# Patient Record
Sex: FEMALE | Race: WHITE | NOT HISPANIC OR LATINO | Employment: FULL TIME | URBAN - METROPOLITAN AREA
[De-identification: names, ages, dates, MRNs, and addresses within clinical notes are randomized per-mention and may not be internally consistent; named-entity substitution may affect disease eponyms.]

---

## 2019-04-03 ENCOUNTER — HOSPITAL ENCOUNTER (EMERGENCY)
Facility: HOSPITAL | Age: 24
Discharge: HOME | End: 2019-04-03
Payer: COMMERCIAL

## 2019-04-03 VITALS
RESPIRATION RATE: 16 BRPM | HEART RATE: 83 BPM | OXYGEN SATURATION: 97 % | TEMPERATURE: 99 F | BODY MASS INDEX: 34.15 KG/M2 | HEIGHT: 64 IN | DIASTOLIC BLOOD PRESSURE: 73 MMHG | SYSTOLIC BLOOD PRESSURE: 118 MMHG | WEIGHT: 200 LBS

## 2019-04-03 DIAGNOSIS — L02.01 ABSCESS OF CHIN: Primary | ICD-10-CM

## 2019-04-03 PROCEDURE — 99284 EMERGENCY DEPT VISIT MOD MDM: CPT

## 2019-04-03 PROCEDURE — 87070 CULTURE OTHR SPECIMN AEROBIC: CPT

## 2019-04-03 PROCEDURE — 96372 THER/PROPH/DIAG INJ SC/IM: CPT

## 2019-04-03 PROCEDURE — 10060 I&D ABSCESS SIMPLE/SINGLE: CPT

## 2019-04-03 PROCEDURE — 25000003 PHARM REV CODE 250: Performed by: NURSE PRACTITIONER

## 2019-04-03 PROCEDURE — 87077 CULTURE AEROBIC IDENTIFY: CPT

## 2019-04-03 PROCEDURE — 63600175 PHARM REV CODE 636 W HCPCS: Performed by: NURSE PRACTITIONER

## 2019-04-03 PROCEDURE — 87186 SC STD MICRODIL/AGAR DIL: CPT

## 2019-04-03 RX ORDER — TRAMADOL HYDROCHLORIDE 50 MG/1
50 TABLET ORAL EVERY 6 HOURS PRN
Qty: 12 TABLET | Refills: 0 | Status: ON HOLD | OUTPATIENT
Start: 2019-04-03 | End: 2021-08-25 | Stop reason: HOSPADM

## 2019-04-03 RX ORDER — LIDOCAINE HYDROCHLORIDE 10 MG/ML
5 INJECTION, SOLUTION EPIDURAL; INFILTRATION; INTRACAUDAL; PERINEURAL
Status: DISCONTINUED | OUTPATIENT
Start: 2019-04-03 | End: 2019-04-03

## 2019-04-03 RX ORDER — MUPIROCIN 20 MG/G
OINTMENT TOPICAL 3 TIMES DAILY
Status: ON HOLD | COMMUNITY
End: 2021-08-25 | Stop reason: HOSPADM

## 2019-04-03 RX ORDER — LIDOCAINE HYDROCHLORIDE 10 MG/ML
5 INJECTION INFILTRATION; PERINEURAL ONCE
Status: COMPLETED | OUTPATIENT
Start: 2019-04-03 | End: 2019-04-03

## 2019-04-03 RX ORDER — CEFTRIAXONE 1 G/1
1 INJECTION, POWDER, FOR SOLUTION INTRAMUSCULAR; INTRAVENOUS
Status: COMPLETED | OUTPATIENT
Start: 2019-04-03 | End: 2019-04-03

## 2019-04-03 RX ORDER — SULFAMETHOXAZOLE AND TRIMETHOPRIM 800; 160 MG/1; MG/1
1 TABLET ORAL
Status: ON HOLD | COMMUNITY
End: 2021-08-25 | Stop reason: HOSPADM

## 2019-04-03 RX ADMIN — CEFTRIAXONE SODIUM 1 G: 1 INJECTION, POWDER, FOR SOLUTION INTRAMUSCULAR; INTRAVENOUS at 08:04

## 2019-04-03 RX ADMIN — LIDOCAINE HYDROCHLORIDE 5 ML: 10 INJECTION, SOLUTION INFILTRATION; PERINEURAL at 08:04

## 2019-04-04 NOTE — DISCHARGE INSTRUCTIONS
Cont with Bactrim and bacitracin  Ultram for pain if Motrin not effective  Warm compresses  Wash hands

## 2019-04-04 NOTE — ED TRIAGE NOTES
"" I went to the emergency room yesterday" reports I&D was attempting yesterday at [facility], pt reported doctor states " very little" drainage come out of wound during I&D currently present to the ER with worsening swelling to L sided chin/jaw, reports abscess x 3 days, denies fever/cjhills, rates pain 9/10, place on antibiotics Bactrim DS  "

## 2019-04-06 LAB — BACTERIA SPEC AEROBE CULT: NORMAL

## 2019-04-06 NOTE — ED PROVIDER NOTES
Encounter Date: 4/3/2019       History     Chief Complaint   Patient presents with    Abscess     on chin, seen in ED yesterday at [facility], needle I&D and prescribed antibx.  Patient says it is worse today     Nyla Cabrera is a 23 y,o female with no sign PMHx. She presents to ED with abscess to chin x 3 days  She was seen at [facility] yesterday. Needle I&D performed and Bactrim prescribed  Patient concerned with increased amount of swelling. No drainage  No fever, chills or body aches          Review of patient's allergies indicates:   Allergen Reactions    No known drug allergies      Past Medical History:   Diagnosis Date    Acute bronchitis with bronchospasm     Influenza B 12/15/10    Respiratory Flu, Type B    Mild allergic rhinitis     Vision abnormalities     Myopia w/ Contacts/Corrective Lenses     Past Surgical History:   Procedure Laterality Date    ADENOIDECTOMY      Age 7  T&A    TONSILLECTOMY      T&A   Age 7     Family History   Problem Relation Age of Onset    Hypertension Father     Obesity Father     Sleep apnea Father     Anemia Sister     Thyroid disease Sister     Allergic rhinitis Sister     Insulin resistance Sister     Obesity Sister         Overweight/Obesity    Other Sister         1) RAD. 2) s/p T&A    Eczema Sister     Vision loss Sister         Myopia    Allergies Sister         Food Allergies    Asthma Maternal Uncle         Childhood    Hypertension Paternal Uncle     Obesity Paternal Uncle     Sleep apnea Paternal Uncle     Breast cancer Maternal Grandmother         Survivor    Mitral valve prolapse Maternal Grandmother     Arthritis Maternal Grandmother     Asthma Paternal Grandmother     Thyroid disease Paternal Grandmother     Depression Paternal Grandmother     Fibromyalgia Paternal Grandmother     Diverticulitis Paternal Grandmother     Asthma Sister     Allergic rhinitis Sister     Other Sister         s/p T&A    Eczema Sister     Breast cancer Other      Arthritis Other     Heart disease Other     Rheum arthritis Other     Cancer Other     Asthma Other     Eczema Other     Allergic rhinitis Other     Hypertension Other     Cancer Other     Rheum arthritis Mother      Social History     Tobacco Use    Smoking status: Never Smoker    Smokeless tobacco: Never Used   Substance Use Topics    Alcohol use: No    Drug use: No     Review of Systems   Constitutional: Negative for chills and fever.   HENT: Negative for sore throat.    Respiratory: Negative for shortness of breath.    Cardiovascular: Negative for chest pain.   Gastrointestinal: Negative for nausea.   Genitourinary: Negative for dysuria.   Musculoskeletal: Negative for back pain and myalgias.   Skin: Positive for wound. Negative for rash (chin).   Neurological: Negative for weakness.   Hematological: Does not bruise/bleed easily.   All other systems reviewed and are negative.      Physical Exam     Initial Vitals [04/03/19 1905]   BP Pulse Resp Temp SpO2   118/73 83 16 99 °F (37.2 °C) 97 %      MAP       --         Physical Exam    Nursing note and vitals reviewed.  Constitutional: She appears well-developed and well-nourished.   HENT:   Head: Normocephalic.       Right Ear: External ear normal.   Left Ear: External ear normal.   Mouth/Throat: Oropharynx is clear and moist.   Eyes: Pupils are equal, round, and reactive to light.   Neck: Normal range of motion.   Cardiovascular: Normal rate, regular rhythm and normal heart sounds.   Pulmonary/Chest: Breath sounds normal.   Abdominal: Soft. Bowel sounds are normal. She exhibits no distension. There is no tenderness. There is no rebound and no guarding.   Musculoskeletal: Normal range of motion.   Neurological: She is alert and oriented to person, place, and time.   Skin: Skin is warm and dry. Abscess noted.   Psychiatric: She has a normal mood and affect. Her behavior is normal. Judgment and thought content normal.         ED Course   I & D -  Incision and Drainage  Date/Time: 4/6/2019 2:04 PM  Performed by: Marichuy Escoto NP  Authorized by: Marichuy Escoto NP   Type: abscess  Location: chin.    Anesthesia:  Local Anesthetic: lidocaine 1% without epinephrine  Anesthetic total: 4 mL  Scalpel size: 10  Incision type: single straight  Complexity: simple  Drainage: pus  Drainage amount: moderate  Wound treatment: incision,  wound left open and  deloculation  Patient tolerance: Patient tolerated the procedure well with no immediate complications        Labs Reviewed   CULTURE, AEROBIC  (SPECIFY SOURCE)          Imaging Results    None          Medical Decision Making:   Initial Assessment:   Patient with abscess to chin x 3 days  She was seen at [facility] yesterday. Needle I&D performed and Bactrim prescribed  Patient concerned with increased amount of swelling. No drainage  No fever, chills or body aches    Patient with 4cm x 4 cm moderate induration to left chin area  Swelling extends to lower lip  +flucuance  Differential Diagnosis:   Abscess  Cellulitis  sepsis    ED Management:  I&D performed. Wound culture obtianed    Patient instructed to return to ED tomorrow for wound check    Cont with Bactrim.     Ultram prescribed for pain    Discussed physical exam findings with patient  No acute emergent medical condition identified at this time to warrant further testing/diagnostics.  Plan to discharge patient home with instructions per AVS.  Follow-up tomorrow for wound check or return to ED if symptoms worsen.  Patient verbalized agreement to discharge treatment plan.                      Clinical Impression:       ICD-10-CM ICD-9-CM   1. Abscess of chin L02.01 682.0                                Marichuy Escoto NP  04/06/19 [id number]

## 2021-08-08 PROBLEM — O26.899 VAGINAL DISCHARGE DURING PREGNANCY: Status: ACTIVE | Noted: 2021-08-08

## 2021-08-08 PROBLEM — N89.8 VAGINAL DISCHARGE DURING PREGNANCY: Status: ACTIVE | Noted: 2021-08-08

## 2021-08-23 PROBLEM — Z34.90 ENCOUNTER FOR ELECTIVE INDUCTION OF LABOR: Status: ACTIVE | Noted: 2021-08-23

## 2021-10-11 ENCOUNTER — TELEPHONE (OUTPATIENT)
Dept: ORTHOPEDICS | Facility: CLINIC | Age: 26
End: 2021-10-11

## 2021-10-12 ENCOUNTER — OFFICE VISIT (OUTPATIENT)
Dept: ORTHOPEDICS | Facility: CLINIC | Age: 26
End: 2021-10-12
Payer: COMMERCIAL

## 2021-10-12 ENCOUNTER — HOSPITAL ENCOUNTER (OUTPATIENT)
Dept: RADIOLOGY | Facility: HOSPITAL | Age: 26
Discharge: HOME | End: 2021-10-12
Attending: ORTHOPAEDIC SURGERY
Payer: COMMERCIAL

## 2021-10-12 VITALS — BODY MASS INDEX: 39.95 KG/M2 | HEIGHT: 64 IN | WEIGHT: 234 LBS | RESPIRATION RATE: 18 BRPM

## 2021-10-12 DIAGNOSIS — S52.552A OTHER CLOSED EXTRA-ARTICULAR FRACTURE OF DISTAL END OF LEFT RADIUS, INITIAL ENCOUNTER: Primary | ICD-10-CM

## 2021-10-12 DIAGNOSIS — M25.531 RIGHT WRIST PAIN: Primary | ICD-10-CM

## 2021-10-12 DIAGNOSIS — M25.531 RIGHT WRIST PAIN: ICD-10-CM

## 2021-10-12 DIAGNOSIS — M25.532 LEFT WRIST PAIN: ICD-10-CM

## 2021-10-12 PROCEDURE — 73110 X-RAY EXAM OF WRIST: CPT | Mod: 26,LT,, | Performed by: RADIOLOGY

## 2021-10-12 PROCEDURE — 99203 PR OFFICE/OUTPT VISIT, NEW, LEVL III, 30-44 MIN: ICD-10-PCS | Mod: 25,S$GLB,, | Performed by: ORTHOPAEDIC SURGERY

## 2021-10-12 PROCEDURE — 29075 APPL CST ELBW FNGR SHORT ARM: CPT | Mod: LT,S$GLB,, | Performed by: ORTHOPAEDIC SURGERY

## 2021-10-12 PROCEDURE — 99999 PR PBB SHADOW E&M-EST. PATIENT-LVL III: CPT | Mod: PBBFAC,,, | Performed by: ORTHOPAEDIC SURGERY

## 2021-10-12 PROCEDURE — 3008F PR BODY MASS INDEX (BMI) DOCUMENTED: ICD-10-PCS | Mod: CPTII,S$GLB,, | Performed by: ORTHOPAEDIC SURGERY

## 2021-10-12 PROCEDURE — 99999 PR PBB SHADOW E&M-EST. PATIENT-LVL III: ICD-10-PCS | Mod: PBBFAC,,, | Performed by: ORTHOPAEDIC SURGERY

## 2021-10-12 PROCEDURE — 73110 XR WRIST COMPLETE 3 VIEWS LEFT: ICD-10-PCS | Mod: 26,LT,, | Performed by: RADIOLOGY

## 2021-10-12 PROCEDURE — 73110 X-RAY EXAM OF WRIST: CPT | Mod: TC,PN,LT

## 2021-10-12 PROCEDURE — 29075 PR APPLY FOREARM CAST: ICD-10-PCS | Mod: LT,S$GLB,, | Performed by: ORTHOPAEDIC SURGERY

## 2021-10-12 PROCEDURE — 3008F BODY MASS INDEX DOCD: CPT | Mod: CPTII,S$GLB,, | Performed by: ORTHOPAEDIC SURGERY

## 2021-10-12 PROCEDURE — 1159F PR MEDICATION LIST DOCUMENTED IN MEDICAL RECORD: ICD-10-PCS | Mod: CPTII,S$GLB,, | Performed by: ORTHOPAEDIC SURGERY

## 2021-10-12 PROCEDURE — 99203 OFFICE O/P NEW LOW 30 MIN: CPT | Mod: 25,S$GLB,, | Performed by: ORTHOPAEDIC SURGERY

## 2021-10-12 PROCEDURE — 1159F MED LIST DOCD IN RCRD: CPT | Mod: CPTII,S$GLB,, | Performed by: ORTHOPAEDIC SURGERY

## 2021-10-12 RX ORDER — COPPER 313.4 MG/1
INTRAUTERINE DEVICE INTRAUTERINE
COMMUNITY
Start: 2021-10-07

## 2021-10-29 ENCOUNTER — HOSPITAL ENCOUNTER (OUTPATIENT)
Dept: RADIOLOGY | Facility: HOSPITAL | Age: 26
Discharge: HOME | End: 2021-10-29
Attending: ORTHOPAEDIC SURGERY
Payer: COMMERCIAL

## 2021-10-29 ENCOUNTER — OFFICE VISIT (OUTPATIENT)
Dept: ORTHOPEDICS | Facility: CLINIC | Age: 26
End: 2021-10-29
Payer: COMMERCIAL

## 2021-10-29 DIAGNOSIS — S52.552A OTHER CLOSED EXTRA-ARTICULAR FRACTURE OF DISTAL END OF LEFT RADIUS, INITIAL ENCOUNTER: Primary | ICD-10-CM

## 2021-10-29 DIAGNOSIS — S52.552A OTHER CLOSED EXTRA-ARTICULAR FRACTURE OF DISTAL END OF LEFT RADIUS, INITIAL ENCOUNTER: ICD-10-CM

## 2021-10-29 PROCEDURE — 1160F RVW MEDS BY RX/DR IN RCRD: CPT | Mod: CPTII,S$GLB,, | Performed by: ORTHOPAEDIC SURGERY

## 2021-10-29 PROCEDURE — 73110 XR WRIST COMPLETE 3 VIEWS LEFT: ICD-10-PCS | Mod: 26,LT,, | Performed by: RADIOLOGY

## 2021-10-29 PROCEDURE — 99999 PR PBB SHADOW E&M-EST. PATIENT-LVL II: ICD-10-PCS | Mod: PBBFAC,,, | Performed by: ORTHOPAEDIC SURGERY

## 2021-10-29 PROCEDURE — 73110 X-RAY EXAM OF WRIST: CPT | Mod: TC,PN,LT

## 2021-10-29 PROCEDURE — 73110 X-RAY EXAM OF WRIST: CPT | Mod: 26,LT,, | Performed by: RADIOLOGY

## 2021-10-29 PROCEDURE — 99999 PR PBB SHADOW E&M-EST. PATIENT-LVL II: CPT | Mod: PBBFAC,,, | Performed by: ORTHOPAEDIC SURGERY

## 2021-10-29 PROCEDURE — 1159F MED LIST DOCD IN RCRD: CPT | Mod: CPTII,S$GLB,, | Performed by: ORTHOPAEDIC SURGERY

## 2021-10-29 PROCEDURE — 1160F PR REVIEW ALL MEDS BY PRESCRIBER/CLIN PHARMACIST DOCUMENTED: ICD-10-PCS | Mod: CPTII,S$GLB,, | Performed by: ORTHOPAEDIC SURGERY

## 2021-10-29 PROCEDURE — 1159F PR MEDICATION LIST DOCUMENTED IN MEDICAL RECORD: ICD-10-PCS | Mod: CPTII,S$GLB,, | Performed by: ORTHOPAEDIC SURGERY

## 2021-10-29 PROCEDURE — 99213 OFFICE O/P EST LOW 20 MIN: CPT | Mod: S$GLB,,, | Performed by: ORTHOPAEDIC SURGERY

## 2021-10-29 PROCEDURE — 99213 PR OFFICE/OUTPT VISIT, EST, LEVL III, 20-29 MIN: ICD-10-PCS | Mod: S$GLB,,, | Performed by: ORTHOPAEDIC SURGERY

## 2021-11-19 ENCOUNTER — OFFICE VISIT (OUTPATIENT)
Dept: ORTHOPEDICS | Facility: CLINIC | Age: 26
End: 2021-11-19
Payer: COMMERCIAL

## 2021-11-19 ENCOUNTER — HOSPITAL ENCOUNTER (OUTPATIENT)
Dept: RADIOLOGY | Facility: HOSPITAL | Age: 26
Discharge: HOME | End: 2021-11-19
Attending: ORTHOPAEDIC SURGERY
Payer: COMMERCIAL

## 2021-11-19 VITALS — WEIGHT: 234 LBS | BODY MASS INDEX: 39.95 KG/M2 | RESPIRATION RATE: 18 BRPM | HEIGHT: 64 IN

## 2021-11-19 DIAGNOSIS — S52.552A OTHER CLOSED EXTRA-ARTICULAR FRACTURE OF DISTAL END OF LEFT RADIUS, INITIAL ENCOUNTER: ICD-10-CM

## 2021-11-19 DIAGNOSIS — S52.552A OTHER CLOSED EXTRA-ARTICULAR FRACTURE OF DISTAL END OF LEFT RADIUS, INITIAL ENCOUNTER: Primary | ICD-10-CM

## 2021-11-19 PROCEDURE — 99213 OFFICE O/P EST LOW 20 MIN: CPT | Mod: S$GLB,,, | Performed by: ORTHOPAEDIC SURGERY

## 2021-11-19 PROCEDURE — 1159F MED LIST DOCD IN RCRD: CPT | Mod: CPTII,S$GLB,, | Performed by: ORTHOPAEDIC SURGERY

## 2021-11-19 PROCEDURE — 1159F PR MEDICATION LIST DOCUMENTED IN MEDICAL RECORD: ICD-10-PCS | Mod: CPTII,S$GLB,, | Performed by: ORTHOPAEDIC SURGERY

## 2021-11-19 PROCEDURE — 99213 PR OFFICE/OUTPT VISIT, EST, LEVL III, 20-29 MIN: ICD-10-PCS | Mod: S$GLB,,, | Performed by: ORTHOPAEDIC SURGERY

## 2021-11-19 PROCEDURE — 73110 X-RAY EXAM OF WRIST: CPT | Mod: 26,LT,, | Performed by: RADIOLOGY

## 2021-11-19 PROCEDURE — 73110 XR WRIST COMPLETE 3 VIEWS LEFT: ICD-10-PCS | Mod: 26,LT,, | Performed by: RADIOLOGY

## 2021-11-19 PROCEDURE — 3008F PR BODY MASS INDEX (BMI) DOCUMENTED: ICD-10-PCS | Mod: CPTII,S$GLB,, | Performed by: ORTHOPAEDIC SURGERY

## 2021-11-19 PROCEDURE — 73110 X-RAY EXAM OF WRIST: CPT | Mod: TC,PN,LT

## 2021-11-19 PROCEDURE — 99999 PR PBB SHADOW E&M-EST. PATIENT-LVL III: ICD-10-PCS | Mod: PBBFAC,,, | Performed by: ORTHOPAEDIC SURGERY

## 2021-11-19 PROCEDURE — 3008F BODY MASS INDEX DOCD: CPT | Mod: CPTII,S$GLB,, | Performed by: ORTHOPAEDIC SURGERY

## 2021-11-19 PROCEDURE — 99999 PR PBB SHADOW E&M-EST. PATIENT-LVL III: CPT | Mod: PBBFAC,,, | Performed by: ORTHOPAEDIC SURGERY

## 2021-12-21 ENCOUNTER — OFFICE VISIT (OUTPATIENT)
Dept: ORTHOPEDICS | Facility: CLINIC | Age: 26
End: 2021-12-21
Payer: COMMERCIAL

## 2021-12-21 ENCOUNTER — HOSPITAL ENCOUNTER (OUTPATIENT)
Dept: RADIOLOGY | Facility: HOSPITAL | Age: 26
Discharge: HOME | End: 2021-12-21
Attending: ORTHOPAEDIC SURGERY
Payer: COMMERCIAL

## 2021-12-21 VITALS — HEIGHT: 64 IN | RESPIRATION RATE: 16 BRPM | BODY MASS INDEX: 39.95 KG/M2 | WEIGHT: 234 LBS

## 2021-12-21 DIAGNOSIS — S52.552D OTHER CLOSED EXTRA-ARTICULAR FRACTURE OF DISTAL END OF LEFT RADIUS WITH ROUTINE HEALING, SUBSEQUENT ENCOUNTER: ICD-10-CM

## 2021-12-21 DIAGNOSIS — S52.552A OTHER CLOSED EXTRA-ARTICULAR FRACTURE OF DISTAL END OF LEFT RADIUS, INITIAL ENCOUNTER: Primary | ICD-10-CM

## 2021-12-21 PROCEDURE — 99999 PR PBB SHADOW E&M-EST. PATIENT-LVL III: CPT | Mod: PBBFAC,,, | Performed by: ORTHOPAEDIC SURGERY

## 2021-12-21 PROCEDURE — 73110 X-RAY EXAM OF WRIST: CPT | Mod: 26,LT,, | Performed by: RADIOLOGY

## 2021-12-21 PROCEDURE — 73110 XR WRIST COMPLETE 3 VIEWS LEFT: ICD-10-PCS | Mod: 26,LT,, | Performed by: RADIOLOGY

## 2021-12-21 PROCEDURE — 99212 OFFICE O/P EST SF 10 MIN: CPT | Mod: S$GLB,,, | Performed by: ORTHOPAEDIC SURGERY

## 2021-12-21 PROCEDURE — 73110 X-RAY EXAM OF WRIST: CPT | Mod: TC,PN,LT

## 2021-12-21 PROCEDURE — 99212 PR OFFICE/OUTPT VISIT, EST, LEVL II, 10-19 MIN: ICD-10-PCS | Mod: S$GLB,,, | Performed by: ORTHOPAEDIC SURGERY

## 2021-12-21 PROCEDURE — 99999 PR PBB SHADOW E&M-EST. PATIENT-LVL III: ICD-10-PCS | Mod: PBBFAC,,, | Performed by: ORTHOPAEDIC SURGERY

## 2023-09-26 ENCOUNTER — OFFICE VISIT (OUTPATIENT)
Dept: FAMILY MEDICINE | Facility: CLINIC | Age: 28
End: 2023-09-26
Payer: COMMERCIAL

## 2023-09-26 VITALS
HEIGHT: 64 IN | HEART RATE: 71 BPM | WEIGHT: 225.44 LBS | SYSTOLIC BLOOD PRESSURE: 118 MMHG | BODY MASS INDEX: 38.49 KG/M2 | DIASTOLIC BLOOD PRESSURE: 74 MMHG | RESPIRATION RATE: 18 BRPM | OXYGEN SATURATION: 98 %

## 2023-09-26 DIAGNOSIS — Z23 NEEDS FLU SHOT: ICD-10-CM

## 2023-09-26 DIAGNOSIS — Z13.1 ENCOUNTER FOR SCREENING FOR DIABETES MELLITUS: ICD-10-CM

## 2023-09-26 DIAGNOSIS — N62 LARGE BREASTS: ICD-10-CM

## 2023-09-26 DIAGNOSIS — Z11.4 ENCOUNTER FOR SCREENING FOR HUMAN IMMUNODEFICIENCY VIRUS (HIV): ICD-10-CM

## 2023-09-26 DIAGNOSIS — Z00.00 PREVENTATIVE HEALTH CARE: Primary | ICD-10-CM

## 2023-09-26 DIAGNOSIS — Z13.220 ENCOUNTER FOR SCREENING FOR LIPID DISORDER: ICD-10-CM

## 2023-09-26 DIAGNOSIS — Z82.61 FAMILY HISTORY OF RHEUMATOID ARTHRITIS: ICD-10-CM

## 2023-09-26 DIAGNOSIS — M72.2 PLANTAR FASCIITIS, BILATERAL: ICD-10-CM

## 2023-09-26 DIAGNOSIS — Z11.59 ENCOUNTER FOR HEPATITIS C SCREENING TEST FOR LOW RISK PATIENT: ICD-10-CM

## 2023-09-26 DIAGNOSIS — K13.0 ANGULAR CHEILITIS: ICD-10-CM

## 2023-09-26 PROBLEM — Z34.90 ENCOUNTER FOR ELECTIVE INDUCTION OF LABOR: Status: RESOLVED | Noted: 2021-08-23 | Resolved: 2023-09-26

## 2023-09-26 PROBLEM — N89.8 VAGINAL DISCHARGE DURING PREGNANCY: Status: RESOLVED | Noted: 2021-08-08 | Resolved: 2023-09-26

## 2023-09-26 PROBLEM — O26.899 VAGINAL DISCHARGE DURING PREGNANCY: Status: RESOLVED | Noted: 2021-08-08 | Resolved: 2023-09-26

## 2023-09-26 PROCEDURE — 3008F PR BODY MASS INDEX (BMI) DOCUMENTED: ICD-10-PCS | Mod: CPTII,S$GLB,, | Performed by: STUDENT IN AN ORGANIZED HEALTH CARE EDUCATION/TRAINING PROGRAM

## 2023-09-26 PROCEDURE — 99204 PR OFFICE/OUTPT VISIT, NEW, LEVL IV, 45-59 MIN: ICD-10-PCS | Mod: 25,S$GLB,, | Performed by: STUDENT IN AN ORGANIZED HEALTH CARE EDUCATION/TRAINING PROGRAM

## 2023-09-26 PROCEDURE — 3078F DIAST BP <80 MM HG: CPT | Mod: CPTII,S$GLB,, | Performed by: STUDENT IN AN ORGANIZED HEALTH CARE EDUCATION/TRAINING PROGRAM

## 2023-09-26 PROCEDURE — G0008 FLU VACCINE (QUAD) GREATER THAN OR EQUAL TO 3YO PRESERVATIVE FREE IM: ICD-10-PCS | Mod: S$GLB,,, | Performed by: STUDENT IN AN ORGANIZED HEALTH CARE EDUCATION/TRAINING PROGRAM

## 2023-09-26 PROCEDURE — 99385 PREV VISIT NEW AGE 18-39: CPT | Mod: S$GLB,,, | Performed by: STUDENT IN AN ORGANIZED HEALTH CARE EDUCATION/TRAINING PROGRAM

## 2023-09-26 PROCEDURE — 99999 PR PBB SHADOW E&M-EST. PATIENT-LVL III: ICD-10-PCS | Mod: PBBFAC,,, | Performed by: STUDENT IN AN ORGANIZED HEALTH CARE EDUCATION/TRAINING PROGRAM

## 2023-09-26 PROCEDURE — 3074F PR MOST RECENT SYSTOLIC BLOOD PRESSURE < 130 MM HG: ICD-10-PCS | Mod: CPTII,S$GLB,, | Performed by: STUDENT IN AN ORGANIZED HEALTH CARE EDUCATION/TRAINING PROGRAM

## 2023-09-26 PROCEDURE — 1159F PR MEDICATION LIST DOCUMENTED IN MEDICAL RECORD: ICD-10-PCS | Mod: CPTII,S$GLB,, | Performed by: STUDENT IN AN ORGANIZED HEALTH CARE EDUCATION/TRAINING PROGRAM

## 2023-09-26 PROCEDURE — 99204 OFFICE O/P NEW MOD 45 MIN: CPT | Mod: 25,S$GLB,, | Performed by: STUDENT IN AN ORGANIZED HEALTH CARE EDUCATION/TRAINING PROGRAM

## 2023-09-26 PROCEDURE — 90686 FLU VACCINE (QUAD) GREATER THAN OR EQUAL TO 3YO PRESERVATIVE FREE IM: ICD-10-PCS | Mod: S$GLB,,, | Performed by: STUDENT IN AN ORGANIZED HEALTH CARE EDUCATION/TRAINING PROGRAM

## 2023-09-26 PROCEDURE — 3078F PR MOST RECENT DIASTOLIC BLOOD PRESSURE < 80 MM HG: ICD-10-PCS | Mod: CPTII,S$GLB,, | Performed by: STUDENT IN AN ORGANIZED HEALTH CARE EDUCATION/TRAINING PROGRAM

## 2023-09-26 PROCEDURE — 3074F SYST BP LT 130 MM HG: CPT | Mod: CPTII,S$GLB,, | Performed by: STUDENT IN AN ORGANIZED HEALTH CARE EDUCATION/TRAINING PROGRAM

## 2023-09-26 PROCEDURE — G0008 ADMIN INFLUENZA VIRUS VAC: HCPCS | Mod: S$GLB,,, | Performed by: STUDENT IN AN ORGANIZED HEALTH CARE EDUCATION/TRAINING PROGRAM

## 2023-09-26 PROCEDURE — 99999 PR PBB SHADOW E&M-EST. PATIENT-LVL III: CPT | Mod: PBBFAC,,, | Performed by: STUDENT IN AN ORGANIZED HEALTH CARE EDUCATION/TRAINING PROGRAM

## 2023-09-26 PROCEDURE — 90686 IIV4 VACC NO PRSV 0.5 ML IM: CPT | Mod: S$GLB,,, | Performed by: STUDENT IN AN ORGANIZED HEALTH CARE EDUCATION/TRAINING PROGRAM

## 2023-09-26 PROCEDURE — 99385 PR PREVENTIVE VISIT,NEW,18-39: ICD-10-PCS | Mod: S$GLB,,, | Performed by: STUDENT IN AN ORGANIZED HEALTH CARE EDUCATION/TRAINING PROGRAM

## 2023-09-26 PROCEDURE — 3008F BODY MASS INDEX DOCD: CPT | Mod: CPTII,S$GLB,, | Performed by: STUDENT IN AN ORGANIZED HEALTH CARE EDUCATION/TRAINING PROGRAM

## 2023-09-26 PROCEDURE — 1159F MED LIST DOCD IN RCRD: CPT | Mod: CPTII,S$GLB,, | Performed by: STUDENT IN AN ORGANIZED HEALTH CARE EDUCATION/TRAINING PROGRAM

## 2023-09-26 RX ORDER — ALBUTEROL SULFATE 90 UG/1
AEROSOL, METERED RESPIRATORY (INHALATION)
COMMUNITY
Start: 2023-04-07

## 2023-09-26 NOTE — PROGRESS NOTES
Plan:     Nyla was seen today for establish care.    Diagnoses and all orders for this visit:    Preventative health care  Discussed age appropriate preventative healthcare items such as cancer screenings and recommended immunizations. Discussed whether patient is using tobacco, alcohol, or illicit drugs and any concerns were discussed. Discussed maintenance of a healthy weight. Patient queried if she has any additional questions about preventative healthcare and all questions were answered.  -     Hepatitis C Antibody; Future  -     HIV 1/2 Ag/Ab (4th Gen); Future  -     Hemoglobin A1C; Future  -     Lipid Panel; Future  -     Comprehensive Metabolic Panel; Future  -     CBC Auto Differential; Future    Encounter for screening for diabetes mellitus  -     Hemoglobin A1C; Future  -     Comprehensive Metabolic Panel; Future    Encounter for screening for lipid disorder  -     Lipid Panel; Future    Encounter for hepatitis C screening test for low risk patient  -     Hepatitis C Antibody; Future    Encounter for screening for human immunodeficiency virus (HIV)  -     HIV 1/2 Ag/Ab (4th Gen); Future    Angular cheilitis  -     miconazole nitrate 2% (MICOTIN) 2 % Oint; Apply topically 3 (three) times daily. for 21 days    Family history of rheumatoid arthritis  -     JONH by IFA, w/Rflx; Future  -     RHEUMATOID FACTOR; Future    Needs flu shot  -     Influenza - Quadrivalent *Preferred* (6 months+) (PF)    Large breasts  -     Ambulatory referral/consult to Breast Surgery; Future    Plantar fasciitis, bilateral: Patient provided with plantar fasciitis excises, including rolling on frozen bottle, (instructions with diagrams) with goal to complete exercises 3-5x/week for 4-6 weeks. Also recommended shoe inserts for symptomatic relief.    Follow up in about 4 weeks (around 10/24/2023).    Lili Ha MD  09/26/2023    Subjective:      Patient ID: Nyla Hanks is a 27 y.o. female    Chief Complaint   Patient  presents with    [facility]     [facility]  27 y.o. female with a PMHx as documented below presents to clinic today for the following:    Annual exam, no specific concerns at this time. No active/long-term medical conditions, no daily medications.     Pt reports dry, cracked skin at the bilateral corners of the lips, refractory to lip balm, moisturizing agents, and treatment with mupirocin ointment. Symptoms wax/wane in severity. No identifiable triggers (foods, drinks, toothpastes, mouthwashes, skin care products). She does not use medicated lip balm.     Family Hx RA - she is worried that she might eventually develop RA. Reports intermittent myalgia/arthralgias, overall mild, not significantly impacting ADLs.     Pt reports 6+ month history of bilateral heel pain. Pain is described as sharp and stabbing. Pain is worse in the AM and improves throughout the day. No associated numbness/tingling/weakness of the BLE.    Pt reports chronic back pain 2/2 large breasts - interested in breast reduction. She has already been specially fitted for bras to provide the best support.    Pap last year /July with Dr. Villa - reportedly normal.    Reports being UTD on tdap.     ROS  Constitutional:  Negative for chills and fever.   Respiratory:  Negative for shortness of breath.    Cardiovascular:  Negative for chest pain.   Gastrointestinal:  Negative for abdominal pain, constipation, diarrhea, nausea and vomiting.     Current Outpatient Medications   Medication Instructions    albuterol (PROVENTIL/VENTOLIN HFA) 90 mcg/actuation inhaler SMARTSI-2 Puff(s) By Mouth Every 4 Hours PRN    miconazole nitrate 2% (MICOTIN) 2 % Oint Topical (Top), 3 times daily    PARAGARD T 380A 380 square mm IUD No dose, route, or frequency recorded.    prenatal 25/iron/folate 6/dha (PRENA1 ORAL) Oral      Past Medical History:   Diagnosis Date    Abnormal Pap smear of cervix     Anxiety disorder, unspecified     Febrile seizures     as an infant     Mild allergic rhinitis     Myopia of both eyes (wears contacts/corrective lenses)     Thyroid disease     benign nodules     Past Surgical History:   Procedure Laterality Date    ADENOIDECTOMY  2002    LASIK      TONSILLECTOMY  2002    WISDOM TOOTH EXTRACTION       Review of patient's allergies indicates:   Allergen Reactions    No known drug allergies      Family History   Problem Relation Age of Onset    Rheum arthritis Mother     Hypertension Father     Obesity Father     Sleep apnea Father     Anemia Sister     Thyroid disease Sister     Allergic rhinitis Sister     Insulin resistance Sister     Obesity Sister     Other Sister         1) RAD. 2) s/p T&A    Eczema Sister     Vision loss Sister         Myopia    Allergies Sister         Food Allergies    Asthma Sister     Allergic rhinitis Sister     Other Sister         s/p T&A    Eczema Sister     Breast cancer Maternal Grandmother         Survivor    Mitral valve prolapse Maternal Grandmother     Arthritis Maternal Grandmother     Asthma Paternal Grandmother     Thyroid disease Paternal Grandmother     Depression Paternal Grandmother     Fibromyalgia Paternal Grandmother     Diverticulitis Paternal Grandmother     Asthma Maternal Uncle         Childhood    Hypertension Paternal Uncle     Obesity Paternal Uncle     Sleep apnea Paternal Uncle     Breast cancer Other     Arthritis Other     Heart disease Other     Rheum arthritis Other     Cancer Other     Asthma Other     Eczema Other     Allergic rhinitis Other     Hypertension Other     Cancer Other      Social History     Tobacco Use    Smoking status: Never    Smokeless tobacco: Never   Substance Use Topics    Alcohol use: No    Drug use: No     Currently on File with [facility]:   Most Recent Immunizations   Administered Date(s) Administered    DTaP 12/07/1999    HIB 11/11/1996    HPV 9-Valent 10/03/2018    Hepatitis B, Pediatric/Adolescent 05/08/1996    IPV 12/07/1999    Influenza - Quadrivalent - PF  "*Preferred* (6 months and older) 09/26/2023    MMR 12/07/1999    Meningococcal Conjugate (MCV4P) 04/29/2009    Tdap 04/29/2009    Varicella 04/29/2009     Objective:      Vitals:    09/26/23 0814   BP: 118/74   BP Location: Right arm   Patient Position: Sitting   Pulse: 71   Resp: 18   SpO2: 98%   Weight: 102.3 kg (225 lb 6.7 oz)   Height: 5' 4" (1.626 m)     Body mass index is 38.69 kg/m².    Physical Exam  Vitals reviewed.   Constitutional:       General: She is not in acute distress.  HENT:      Head: Normocephalic and atraumatic.      Mouth/Throat:        Comments: Erythematous, scaly skin with mild inflammation at the bilateral corners of the lips as noted above. No overlying crust of lesions. No drainage. No associated oral lesions.  Cardiovascular:      Rate and Rhythm: Normal rate.   Pulmonary:      Effort: Pulmonary effort is normal. No respiratory distress.   Abdominal:      General: Bowel sounds are normal. There is no distension.      Palpations: Abdomen is soft.      Tenderness: There is no abdominal tenderness.   Neurological:      General: No focal deficit present.      Mental Status: She is alert and oriented to person, place, and time. Mental status is at baseline.        Assessment:       1. Preventative health care    2. Encounter for screening for diabetes mellitus    3. Encounter for screening for lipid disorder    4. Encounter for hepatitis C screening test for low risk patient    5. Encounter for screening for human immunodeficiency virus (HIV)    6. Angular cheilitis    7. Family history of rheumatoid arthritis    8. Needs flu shot    9. Large breasts    10. Plantar fasciitis, bilateral        Lili Ha MD  [facility]  Office: [phone]   Fax: [phone]  09/26/2023      Disclaimer: This note was partly generated using dictation software which may occasionally result in transcription errors.    Total time spent on this encounter includes face to " face time and non-face to face time preparing to see the patient (eg, review of tests), obtaining and/or reviewing separately obtained history, documenting clinical information in the electronic or other health record, independently interpreting results, and communicating results to the patient/family/caregiver, or care coordinator.

## 2024-12-09 ENCOUNTER — TELEPHONE (OUTPATIENT)
Dept: FAMILY MEDICINE | Facility: CLINIC | Age: 29
End: 2024-12-09
Payer: COMMERCIAL

## 2024-12-09 NOTE — TELEPHONE ENCOUNTER
----- Message from Maribel sent at 12/9/2024 12:34 PM CST -----  Regarding: sooner apt  Contact: pt  Type:  Sooner Appointment Request    Caller is requesting a sooner appointment.  Caller declined first available appointment listed below.  Caller will not accept being placed on the waitlist and is requesting a message be sent to doctor.    Name of Caller:  pt   When is the first available appointment?    Symptoms:   Would the patient rather a call back or a response via [facility]?   Best Call Back Number:  [phone]    Additional Information:    sussy np call to be scheduled

## 2024-12-31 ENCOUNTER — LAB VISIT (OUTPATIENT)
Dept: LAB | Facility: HOSPITAL | Age: 29
End: 2024-12-31
Payer: COMMERCIAL

## 2024-12-31 ENCOUNTER — TELEPHONE (OUTPATIENT)
Dept: PSYCHIATRY | Facility: CLINIC | Age: 29
End: 2024-12-31
Payer: COMMERCIAL

## 2024-12-31 ENCOUNTER — OFFICE VISIT (OUTPATIENT)
Dept: FAMILY MEDICINE | Facility: CLINIC | Age: 29
End: 2024-12-31
Payer: COMMERCIAL

## 2024-12-31 VITALS
DIASTOLIC BLOOD PRESSURE: 78 MMHG | SYSTOLIC BLOOD PRESSURE: 126 MMHG | HEART RATE: 68 BPM | BODY MASS INDEX: 39.62 KG/M2 | OXYGEN SATURATION: 97 % | WEIGHT: 230.81 LBS

## 2024-12-31 DIAGNOSIS — K13.0 DRY LIPS: ICD-10-CM

## 2024-12-31 DIAGNOSIS — M25.571 PAIN IN JOINTS OF BOTH FEET: ICD-10-CM

## 2024-12-31 DIAGNOSIS — F41.8 DEPRESSION WITH ANXIETY: ICD-10-CM

## 2024-12-31 DIAGNOSIS — F33.2 SEVERE EPISODE OF RECURRENT MAJOR DEPRESSIVE DISORDER, WITHOUT PSYCHOTIC FEATURES: ICD-10-CM

## 2024-12-31 DIAGNOSIS — F41.0 GENERALIZED ANXIETY DISORDER WITH PANIC ATTACKS: ICD-10-CM

## 2024-12-31 DIAGNOSIS — Z00.00 ENCOUNTER FOR GENERAL ADULT MEDICAL EXAMINATION W/O ABNORMAL FINDINGS: Primary | ICD-10-CM

## 2024-12-31 DIAGNOSIS — Z00.00 ENCOUNTER FOR GENERAL ADULT MEDICAL EXAMINATION W/O ABNORMAL FINDINGS: ICD-10-CM

## 2024-12-31 DIAGNOSIS — K43.9 HERNIA OF ABDOMINAL WALL: ICD-10-CM

## 2024-12-31 DIAGNOSIS — M25.572 PAIN IN JOINTS OF BOTH FEET: ICD-10-CM

## 2024-12-31 DIAGNOSIS — F41.1 GENERALIZED ANXIETY DISORDER WITH PANIC ATTACKS: ICD-10-CM

## 2024-12-31 DIAGNOSIS — Z91.52 HISTORY OF NON-SUICIDAL SELF-HARM: ICD-10-CM

## 2024-12-31 DIAGNOSIS — R21 RASH: ICD-10-CM

## 2024-12-31 DIAGNOSIS — D17.1 LIPOMA OF TORSO: ICD-10-CM

## 2024-12-31 DIAGNOSIS — Z86.59 HISTORY OF NIGHT TERRORS: ICD-10-CM

## 2024-12-31 LAB
ALBUMIN SERPL BCP-MCNC: 4.1 G/DL (ref 3.5–5.2)
ALP SERPL-CCNC: 67 U/L (ref 40–150)
ALT SERPL W/O P-5'-P-CCNC: 29 U/L (ref 10–44)
ANION GAP SERPL CALC-SCNC: 7 MMOL/L (ref 8–16)
AST SERPL-CCNC: 22 U/L (ref 10–40)
BASOPHILS # BLD AUTO: 0.02 K/UL (ref 0–0.2)
BASOPHILS NFR BLD: 0.2 % (ref 0–1.9)
BILIRUB SERPL-MCNC: 0.6 MG/DL (ref 0.1–1)
BUN SERPL-MCNC: 10 MG/DL (ref 6–20)
CALCIUM SERPL-MCNC: 8.8 MG/DL (ref 8.7–10.5)
CHLORIDE SERPL-SCNC: 109 MMOL/L (ref 95–110)
CHOLEST SERPL-MCNC: 180 MG/DL (ref 120–199)
CHOLEST/HDLC SERPL: 4.5 {RATIO} (ref 2–5)
CO2 SERPL-SCNC: 22 MMOL/L (ref 23–29)
CREAT SERPL-MCNC: 0.7 MG/DL (ref 0.5–1.4)
DIFFERENTIAL METHOD BLD: ABNORMAL
EOSINOPHIL # BLD AUTO: 0.1 K/UL (ref 0–0.5)
EOSINOPHIL NFR BLD: 0.6 % (ref 0–8)
ERYTHROCYTE [DISTWIDTH] IN BLOOD BY AUTOMATED COUNT: 13.6 % (ref 11.5–14.5)
EST. GFR  (NO RACE VARIABLE): >60 ML/MIN/1.73 M^2
ESTIMATED AVG GLUCOSE: 97 MG/DL (ref 68–131)
GLUCOSE SERPL-MCNC: 82 MG/DL (ref 70–110)
HBA1C MFR BLD: 5 % (ref 4–5.6)
HCT VFR BLD AUTO: 45.4 % (ref 37–48.5)
HDLC SERPL-MCNC: 40 MG/DL (ref 40–75)
HDLC SERPL: 22.2 % (ref 20–50)
HGB BLD-MCNC: 14.4 G/DL (ref 12–16)
IMM GRANULOCYTES # BLD AUTO: 0.04 K/UL (ref 0–0.04)
IMM GRANULOCYTES NFR BLD AUTO: 0.5 % (ref 0–0.5)
LDLC SERPL CALC-MCNC: 107.6 MG/DL (ref 63–159)
LYMPHOCYTES # BLD AUTO: 3 K/UL (ref 1–4.8)
LYMPHOCYTES NFR BLD: 35.3 % (ref 18–48)
MCH RBC QN AUTO: 29.7 PG (ref 27–31)
MCHC RBC AUTO-ENTMCNC: 31.7 G/DL (ref 32–36)
MCV RBC AUTO: 94 FL (ref 82–98)
MONOCYTES # BLD AUTO: 0.5 K/UL (ref 0.3–1)
MONOCYTES NFR BLD: 5.5 % (ref 4–15)
NEUTROPHILS # BLD AUTO: 5 K/UL (ref 1.8–7.7)
NEUTROPHILS NFR BLD: 57.9 % (ref 38–73)
NONHDLC SERPL-MCNC: 140 MG/DL
NRBC BLD-RTO: 0 /100 WBC
PLATELET # BLD AUTO: 259 K/UL (ref 150–450)
PMV BLD AUTO: 11.7 FL (ref 9.2–12.9)
POTASSIUM SERPL-SCNC: 4.5 MMOL/L (ref 3.5–5.1)
PROT SERPL-MCNC: 7.1 G/DL (ref 6–8.4)
RBC # BLD AUTO: 4.85 M/UL (ref 4–5.4)
SODIUM SERPL-SCNC: 138 MMOL/L (ref 136–145)
TRIGL SERPL-MCNC: 162 MG/DL (ref 30–150)
TSH SERPL DL<=0.005 MIU/L-ACNC: 0.56 UIU/ML (ref 0.4–4)
WBC # BLD AUTO: 8.55 K/UL (ref 3.9–12.7)

## 2024-12-31 PROCEDURE — 1159F MED LIST DOCD IN RCRD: CPT | Mod: CPTII,S$GLB,,

## 2024-12-31 PROCEDURE — 83036 HEMOGLOBIN GLYCOSYLATED A1C: CPT

## 2024-12-31 PROCEDURE — 99214 OFFICE O/P EST MOD 30 MIN: CPT | Mod: S$GLB,,,

## 2024-12-31 PROCEDURE — 86039 ANTINUCLEAR ANTIBODIES (ANA): CPT

## 2024-12-31 PROCEDURE — 85025 COMPLETE CBC W/AUTO DIFF WBC: CPT

## 2024-12-31 PROCEDURE — 3078F DIAST BP <80 MM HG: CPT | Mod: CPTII,S$GLB,,

## 2024-12-31 PROCEDURE — 80053 COMPREHEN METABOLIC PANEL: CPT

## 2024-12-31 PROCEDURE — 1160F RVW MEDS BY RX/DR IN RCRD: CPT | Mod: CPTII,S$GLB,,

## 2024-12-31 PROCEDURE — 86235 NUCLEAR ANTIGEN ANTIBODY: CPT | Mod: 59

## 2024-12-31 PROCEDURE — 86038 ANTINUCLEAR ANTIBODIES: CPT

## 2024-12-31 PROCEDURE — 84443 ASSAY THYROID STIM HORMONE: CPT

## 2024-12-31 PROCEDURE — 3008F BODY MASS INDEX DOCD: CPT | Mod: CPTII,S$GLB,,

## 2024-12-31 PROCEDURE — 80061 LIPID PANEL: CPT

## 2024-12-31 PROCEDURE — 99999 PR PBB SHADOW E&M-EST. PATIENT-LVL IV: CPT | Mod: PBBFAC,,,

## 2024-12-31 PROCEDURE — 3074F SYST BP LT 130 MM HG: CPT | Mod: CPTII,S$GLB,,

## 2024-12-31 PROCEDURE — 36415 COLL VENOUS BLD VENIPUNCTURE: CPT | Mod: PO

## 2024-12-31 RX ORDER — FLUOXETINE HYDROCHLORIDE 20 MG/1
20 CAPSULE ORAL DAILY
Qty: 30 CAPSULE | Refills: 1 | Status: SHIPPED | OUTPATIENT
Start: 2024-12-31 | End: 2025-12-31

## 2024-12-31 RX ORDER — HYDROXYZINE HYDROCHLORIDE 10 MG/1
10 TABLET, FILM COATED ORAL 3 TIMES DAILY PRN
Qty: 30 TABLET | Refills: 0 | Status: SHIPPED | OUTPATIENT
Start: 2024-12-31

## 2024-12-31 NOTE — PROGRESS NOTES
Patient ID: Nyla Hanks is a 29 y.o. female.    Chief Complaint: Establish Care    History of Present Illness    CHIEF COMPLAINT:  Nyla presents today for anxiety and depression.    ANXIETY AND DEPRESSION:  She reports experiencing anxiety and depression since having her child. She experienced a panic attack on a plane two months ago and has ongoing panic attacks. She previously used anxiety medication but discontinued during pregnancy. She reports feeling tired or having little energy nearly every day, poor appetite or overeating more than half the days, feeling like a failure more than half the days, and trouble concentrating several days. She has had thoughts of self-harm and being better off dead several days. Her father-in-law was murdered a year before she had her child.    FAMILY HISTORY:  Family history includes ischemic heart disease, asthma, eczema, allergies, hypertension, rheumatoid arthritis, anxiety, and severe psoriasis (father).    MUSCULOSKELETAL:  She experiences joint aches and pains, particularly in the knees and feet, with morning stiffness that improves with activity throughout the day. She was previously diagnosed with plantar fasciitis at urgent care.    SLEEP:  She reports occasional nightmares but history of night terrors.    DERMATOLOGIC:  She reports dry, flaky eyebrows with suspicion of psoriasis. She has experienced cracking on the sides of her lips for approximately four years, which flares with increased stress. She denies elbow involvement.    GASTROINTESTINAL:  She experiences occasional mild heartburn triggered by certain foods, particularly those containing onions. She notes a lump in her abdomen associated slight pinching sensation above the hip and sometimes in the back.    SURGICAL HISTORY:  History of adenoidectomy, tonsillectomy, LASIK surgery, and wisdom tooth extraction.    GYNECOLOGIC HISTORY:  She reports having had a hormonal IUD placed without her knowledge,  which she discovered two years later. She had side effects mostly mental health related to the hormone, now she has paraguard.       ROS:  General: reports fatigue, reports loss of appetite  Gastrointestinal: reports abdominal pain, reports heartburn  Musculoskeletal: reports joint pain  Skin: reports dry skin  Psychiatric: reports anxiety, reports depression, reports sleep difficulty, reports panic attacks ; denies suicidal ideation        Patient Active Problem List   Diagnosis    Family history of ischemic heart disease (IHD)    Family history of asthma    Family history of eczema    Family history of allergies    Mild allergic rhinitis    Family history of hypertension    Family history of rheumatoid arthritis       Current Outpatient Medications on File Prior to Visit   Medication Sig Dispense Refill    PARAGARD T 380A 380 square mm IUD       miconazole nitrate 2% (MICOTIN) 2 % Oint Apply topically 3 (three) times daily. for 21 days 71 g 0    [DISCONTINUED] albuterol (PROVENTIL/VENTOLIN HFA) 90 mcg/actuation inhaler SMARTSI-2 Puff(s) By Mouth Every 4 Hours PRN      [DISCONTINUED] prenatal 25/iron/folate 6/dha (PRENA1 ORAL) Take by mouth. (Patient not taking: Reported on 2024)       No current facility-administered medications on file prior to visit.       Past Medical History:   Diagnosis Date    Abnormal Pap smear of cervix     Anxiety disorder, unspecified     Febrile seizures     as an infant    Mild allergic rhinitis     Myopia of both eyes (wears contacts/corrective lenses)     Thyroid disease     benign nodules       Past Surgical History:   Procedure Laterality Date    ADENOIDECTOMY  2002    LASIK      TONSILLECTOMY  2002    WISDOM TOOTH EXTRACTION          Family History   Problem Relation Name Age of Onset    Rheum arthritis Mother Rosa     Hypertension Father Saleem     Obesity Father Saleem     Sleep apnea Father Saleem     Anemia Sister Maryse     Thyroid disease Sister Maryse      Allergic rhinitis Sister Maryse     Insulin resistance Sister Maryse     Obesity Sister Maryse     Other Sister Maryse         1) RAD. 2) s/p T&A    Eczema Sister Maryse     Vision loss Sister Maryse         Myopia    Allergies Sister Maryse         Food Allergies    Asthma Sister Chrissy     Allergic rhinitis Sister Chrissy     Other Sister Chrissy         s/p T&A    Eczema Sister Chrissy     Breast cancer Maternal Grandmother          Survivor    Mitral valve prolapse Maternal Grandmother      Arthritis Maternal Grandmother      Asthma Paternal Grandmother      Thyroid disease Paternal Grandmother      Depression Paternal Grandmother      Fibromyalgia Paternal Grandmother      Diverticulitis Paternal Grandmother      Asthma Maternal Uncle x1         Childhood    Hypertension Paternal Uncle x1     Obesity Paternal Uncle x1     Sleep apnea Paternal Uncle x1     Breast cancer Other Mat Dz     Arthritis Other Mat Dz     Heart disease Other Mat Dz     Rheum arthritis Other Mat Dz     Cancer Other Mat Dz     Asthma Other Pat Dz     Eczema Other Pat Dz     Allergic rhinitis Other Pat Dz     Hypertension Other Pat Dz     Cancer Other Pat Dz        Social History     Socioeconomic History    Marital status:    Tobacco Use    Smoking status: Never    Smokeless tobacco: Never   Substance and Sexual Activity    Alcohol use: No    Drug use: No    Sexual activity: Never   Social History Narrative    SOC.HX:  Intact family; parents . Lives with Mom (Rosa), Dad, and 2 siblings (Maryse, Chrissy) in [place], [place]. Mom & Dad have family in the area. Lots of support from Mom's/Dad's families. Mom at home. Dad works doing Computer Models w/ an [employer]. Other Caregivers: Grandparents. NO Smokers. + Pets -- 1 dog, indoor, 2 dogs, outdoor.        EDU:  Will be in 11th Grade at [employer]. Good grades: YES, B's-A's, some C's the last year; Good conduct: YES; Lots of friends: YES.         SAFETY:  Wears seatbelt 100% of time. Guns -- Yes, locked up, hunting.    -- Activities: Softball, Soccer, Phone/Texting, Computer, TV, Swimming, some Archery, Video games.    -- Drugs: Illicit drugs: NO; Alcohol: NO; Tobacco: NO.    -- Sex: Denies sexual activity.       Review of patient's allergies indicates:   Allergen Reactions    No known drug allergies         Health Maintenance Due   Topic Date Due    Hepatitis C Screening  Never done    HIV Screening  Never done    COVID-19 Vaccine (2 - 2024-25 season) 09/01/2024       Objective     Vitals:    12/31/24 0958   BP: 126/78   Pulse: 68      Body mass index is 39.62 kg/m².     Physical Exam  Vitals and nursing note reviewed.   Constitutional:       General: She is not in acute distress.     Appearance: Normal appearance. She is not ill-appearing or toxic-appearing.   HENT:      Head: Normocephalic and atraumatic.      Nose: Nose normal.      Mouth/Throat:      Mouth: Mucous membranes are moist.   Eyes:      Extraocular Movements: Extraocular movements intact.   Cardiovascular:      Rate and Rhythm: Normal rate and regular rhythm.      Heart sounds: Normal heart sounds. No murmur heard.     No friction rub. No gallop.   Pulmonary:      Effort: Pulmonary effort is normal.      Breath sounds: Normal breath sounds. No wheezing, rhonchi or rales.   Abdominal:      General: Bowel sounds are normal.      Tenderness: There is no abdominal tenderness. There is no guarding.   Musculoskeletal:      Cervical back: Neck supple.      Right lower leg: No edema.      Left lower leg: No edema.   Lymphadenopathy:      Cervical: No cervical adenopathy.   Skin:     General: Skin is warm.   Neurological:      Mental Status: She is alert and oriented to person, place, and time.   Psychiatric:         Mood and Affect: Mood normal.         Behavior: Behavior normal.      Comments: Tearful when discussing depression and anxiety struggles.          Assessment & Plan    Assessed patient  for depression and anxiety using screening questionnaires; scores indicate moderate anxiety and severe depression  Will start fluoxetine for depression and anxiety, with hydroxyzine as needed for breakthrough anxiety  Ordered CT to evaluate abdominal lump, considering possibilities of lipoma, hernia, or diastasis recti  Checked thyroid function to rule out contribution to mood symptoms    MAJOR DEPRESSIVE DISORDER AND GENERALIZED ANXIETY DISORDER:  - Started fluoxetine 20 mg daily, to be taken in the morning.  - Explained potential side effects of fluoxetine, including possible impact on libido.  - Discussed importance of not abruptly discontinuing antidepressants and need for gradual tapering.  - Started hydroxyzine 10 mg up to 3 times daily as needed for anxiety or insomnia.  - Advised on hydroxyzine's similarity to Benadryl and potential for drowsiness.  - Informed about possibility of worsened anxiety in first 2 weeks of starting new medication.  - Referred to primary care behavioral health for talk therapy.    PSORIASIS AND SKIN ISSUES:  - Refilled myconazole ointment for skin irritation.  - Referred to dermatology for evaluation of skin issues and possible psoriasis.  - Nyla to use Aquaphor on lips nightly for dryness.  - Nyla to use medicated moisturizer chapstick for lip care.    MUSCULOSKELETAL ISSUES:  - Recommend trying freezing a water bottle and rolling foot over it for plantar fasciitis.  - Nyla to use pressure inserts in shoes for knee and foot support.    DIAGNOSTIC TESTS:  - Ordered complete labs including thyroid function and JONH.  - Ordered CT of abdomen WO Contrast to evaluate abdominal lump.    FOLLOW-UP AND GENERAL INSTRUCTIONS:  - Follow up in 2 weeks.  - Contact the office if experiencing any concerning side effects from new medications.  - Use patient portal to message with any questions or concerns.         Encounter for general adult medical examination w/o abnormal findings  -      Comprehensive Metabolic Panel; Future; Expected date: 12/31/2024  -     TSH; Future; Expected date: 12/31/2024  -     Hemoglobin A1C; Future; Expected date: 12/31/2024  -     Lipid Panel; Future; Expected date: 12/31/2024  -     CBC Auto Differential; Future; Expected date: 12/31/2024  -     JONH; Future; Expected date: 12/31/2024    Depression with anxiety    Pain in joints of both feet  -     JONH; Future; Expected date: 12/31/2024    Dry lips  -     Ambulatory referral/consult to Dermatology; Future; Expected date: 01/07/2025    Rash  Comments:  facial flaking, patych areas, very dry lips/oral mucosa, suspect psoriasis.  Orders:  -     Ambulatory referral/consult to Dermatology; Future; Expected date: 01/07/2025    Generalized anxiety disorder with panic attacks  Comments:  EARNESTINE SCORE OF 14.  Orders:  -     Ambulatory referral/consult to Primary Care Behavioral Health (Non-Opioids); Future; Expected date: 01/07/2025  -     hydrOXYzine HCL (ATARAX) 10 MG Tab; Take 1 tablet (10 mg total) by mouth 3 (three) times daily as needed (anxiety, insomnia).  Dispense: 30 tablet; Refill: 0  -     FLUoxetine 20 MG capsule; Take 1 capsule (20 mg total) by mouth once daily.  Dispense: 30 capsule; Refill: 1    Severe episode of recurrent major depressive disorder, without psychotic features  Comments:  MDD SCORE OF 16 - Moderately severe.  Orders:  -     Ambulatory referral/consult to Primary Care Behavioral Health (Non-Opioids); Future; Expected date: 01/07/2025  -     hydrOXYzine HCL (ATARAX) 10 MG Tab; Take 1 tablet (10 mg total) by mouth 3 (three) times daily as needed (anxiety, insomnia).  Dispense: 30 tablet; Refill: 0  -     FLUoxetine 20 MG capsule; Take 1 capsule (20 mg total) by mouth once daily.  Dispense: 30 capsule; Refill: 1    History of night terrors  -     Ambulatory referral/consult to Primary Care Behavioral Health (Non-Opioids); Future; Expected date: 01/07/2025    Hernia of abdominal wall  Comments:  located  above umbilicus, palpable, no significant protrusion w valsalva.  Orders:  -     CT Abdomen Pelvis  Without Contrast; Future; Expected date: 12/31/2024    Lipoma of torso  Comments:  located above umbilicus, palpable, no significant protrusion w valsalva.  Orders:  -     CT Abdomen Pelvis  Without Contrast; Future; Expected date: 12/31/2024    History of non-suicidal self-harm        Follow up in about 2 weeks (around 1/14/2025), or if symptoms worsen or fail to improve.    This note was generated with the assistance of ambient listening technology. Verbal consent was obtained by the patient and accompanying visitor(s) for the recording of patient appointment to facilitate this note. I attest to having reviewed and edited the generated note for accuracy, though some syntax or spelling errors may persist. Please contact the author of this note for any clarification.        Angelica Castro MD  12/31/2024

## 2025-01-02 ENCOUNTER — HOSPITAL ENCOUNTER (OUTPATIENT)
Dept: RADIOLOGY | Facility: HOSPITAL | Age: 30
Discharge: HOME | End: 2025-01-02
Payer: COMMERCIAL

## 2025-01-02 DIAGNOSIS — K43.9 HERNIA OF ABDOMINAL WALL: ICD-10-CM

## 2025-01-02 DIAGNOSIS — D17.1 LIPOMA OF TORSO: ICD-10-CM

## 2025-01-02 LAB
ANA PATTERN 1: NORMAL
ANA SER QL IF: POSITIVE
ANA TITR SER IF: NORMAL {TITER}

## 2025-01-02 PROCEDURE — A9698 NON-RAD CONTRAST MATERIALNOC: HCPCS | Mod: PO

## 2025-01-02 PROCEDURE — 74176 CT ABD & PELVIS W/O CONTRAST: CPT | Mod: 26,,, | Performed by: RADIOLOGY

## 2025-01-02 PROCEDURE — 74176 CT ABD & PELVIS W/O CONTRAST: CPT | Mod: TC,PO

## 2025-01-02 PROCEDURE — 25500020 PHARM REV CODE 255: Mod: PO

## 2025-01-02 RX ADMIN — BARIUM SULFATE 900 ML: 20 SUSPENSION ORAL at 12:01

## 2025-01-03 LAB
ANTI SM ANTIBODY: 0.05 RATIO (ref 0–0.99)
ANTI SM/RNP ANTIBODY: 0.06 RATIO (ref 0–0.99)
ANTI-SM INTERPRETATION: NEGATIVE
ANTI-SM/RNP INTERPRETATION: NEGATIVE
ANTI-SSA ANTIBODY: 0.05 RATIO (ref 0–0.99)
ANTI-SSA INTERPRETATION: NEGATIVE
ANTI-SSB ANTIBODY: 0.05 RATIO (ref 0–0.99)
ANTI-SSB INTERPRETATION: NEGATIVE
DSDNA AB SER-ACNC: NORMAL [IU]/ML

## 2025-01-06 ENCOUNTER — PATIENT MESSAGE (OUTPATIENT)
Dept: FAMILY MEDICINE | Facility: CLINIC | Age: 30
End: 2025-01-06
Payer: COMMERCIAL

## 2025-01-06 DIAGNOSIS — R76.8 ANA POSITIVE: ICD-10-CM

## 2025-01-06 DIAGNOSIS — M25.571 PAIN IN JOINTS OF BOTH FEET: Primary | ICD-10-CM

## 2025-01-06 DIAGNOSIS — M25.572 PAIN IN JOINTS OF BOTH FEET: Primary | ICD-10-CM

## 2025-01-14 ENCOUNTER — OFFICE VISIT (OUTPATIENT)
Dept: FAMILY MEDICINE | Facility: CLINIC | Age: 30
End: 2025-01-14
Payer: COMMERCIAL

## 2025-01-14 VITALS
WEIGHT: 229.5 LBS | DIASTOLIC BLOOD PRESSURE: 80 MMHG | SYSTOLIC BLOOD PRESSURE: 132 MMHG | HEART RATE: 63 BPM | OXYGEN SATURATION: 98 % | BODY MASS INDEX: 39.39 KG/M2

## 2025-01-14 DIAGNOSIS — F41.0 GENERALIZED ANXIETY DISORDER WITH PANIC ATTACKS: Primary | ICD-10-CM

## 2025-01-14 DIAGNOSIS — M25.572 PAIN IN JOINTS OF BOTH FEET: ICD-10-CM

## 2025-01-14 DIAGNOSIS — R76.8 ANA POSITIVE: ICD-10-CM

## 2025-01-14 DIAGNOSIS — F33.2 SEVERE EPISODE OF RECURRENT MAJOR DEPRESSIVE DISORDER, WITHOUT PSYCHOTIC FEATURES: ICD-10-CM

## 2025-01-14 DIAGNOSIS — F41.0 GENERALIZED ANXIETY DISORDER WITH PANIC ATTACKS: ICD-10-CM

## 2025-01-14 DIAGNOSIS — F41.1 GENERALIZED ANXIETY DISORDER WITH PANIC ATTACKS: Primary | ICD-10-CM

## 2025-01-14 DIAGNOSIS — M25.571 PAIN IN JOINTS OF BOTH FEET: ICD-10-CM

## 2025-01-14 DIAGNOSIS — F41.1 GENERALIZED ANXIETY DISORDER WITH PANIC ATTACKS: ICD-10-CM

## 2025-01-14 PROCEDURE — 99999 PR PBB SHADOW E&M-EST. PATIENT-LVL III: CPT | Mod: PBBFAC,,,

## 2025-01-14 PROCEDURE — 1159F MED LIST DOCD IN RCRD: CPT | Mod: CPTII,S$GLB,,

## 2025-01-14 PROCEDURE — 3079F DIAST BP 80-89 MM HG: CPT | Mod: CPTII,S$GLB,,

## 2025-01-14 PROCEDURE — 3008F BODY MASS INDEX DOCD: CPT | Mod: CPTII,S$GLB,,

## 2025-01-14 PROCEDURE — 99214 OFFICE O/P EST MOD 30 MIN: CPT | Mod: S$GLB,,,

## 2025-01-14 PROCEDURE — 3075F SYST BP GE 130 - 139MM HG: CPT | Mod: CPTII,S$GLB,,

## 2025-01-14 RX ORDER — FLUOXETINE HYDROCHLORIDE 20 MG/1
20 CAPSULE ORAL DAILY
Qty: 90 CAPSULE | Refills: 1 | Status: SHIPPED | OUTPATIENT
Start: 2025-01-14 | End: 2026-01-14

## 2025-01-14 NOTE — PROGRESS NOTES
Patient ID: Nyla Hanks is a 29 y.o. female.    Chief Complaint: Follow-up (2w follow up)    History of Present Illness    CHIEF COMPLAINT:  Nyla presents today for follow-up on mental health medication.    MENTAL HEALTH:  She reports significant improvement in mental health with current medication regimen of fluoxetine 20 mg in the morning and hydroxyzine at bedtime. She states feeling much better, which her  has also observed. Her sleep is improving. She denies any issues with morning medication administration.    RHEUMATOLOGY:  She has scheduled a rheumatologist appointment for February following a positive JONH test result. She has a family history of rheumatoid arthritis in her mother.     IMAGING:  Previous imaging with BB marker (placed by radiology to identify area of interest) showed no concerning findings.      ROS:  Psychiatric: denies sleep difficulty         Patient Active Problem List   Diagnosis    Family history of ischemic heart disease (IHD)    Family history of asthma    Family history of eczema    Family history of allergies    Mild allergic rhinitis    Family history of hypertension    Family history of rheumatoid arthritis       Current Outpatient Medications on File Prior to Visit   Medication Sig Dispense Refill    hydrOXYzine HCL (ATARAX) 10 MG Tab Take 1 tablet (10 mg total) by mouth 3 (three) times daily as needed (anxiety, insomnia). 30 tablet 0    PARAGARD T 380A 380 square mm IUD       [DISCONTINUED] FLUoxetine 20 MG capsule Take 1 capsule (20 mg total) by mouth once daily. 30 capsule 1    miconazole nitrate 2% (MICOTIN) 2 % Oint Apply topically 3 (three) times daily. for 21 days 71 g 0     No current facility-administered medications on file prior to visit.       Past Medical History:   Diagnosis Date    Abnormal Pap smear of cervix     Anxiety disorder, unspecified     Febrile seizures     as an infant    Mild allergic rhinitis     Myopia of both eyes (wears  contacts/corrective lenses)     Thyroid disease     benign nodules       Past Surgical History:   Procedure Laterality Date    ADENOIDECTOMY  2002    LASIK      TONSILLECTOMY  2002    WISDOM TOOTH EXTRACTION          Family History   Problem Relation Name Age of Onset    Rheum arthritis Mother Rosa     Hypertension Father Saleem     Obesity Father Saleem     Sleep apnea Father Saleem     Anemia Sister Maryse     Thyroid disease Sister Maryse     Allergic rhinitis Sister Maryse     Insulin resistance Sister Maryse     Obesity Sister Maryse     Other Sister Maryse         1) RAD. 2) s/p T&A    Eczema Sister Maryse     Vision loss Sister Maryse         Myopia    Allergies Sister Maryse         Food Allergies    Asthma Sister Chrissy     Allergic rhinitis Sister Chrissy     Other Sister Chrissy         s/p T&A    Eczema Sister Chrissy     Breast cancer Maternal Grandmother          Survivor    Mitral valve prolapse Maternal Grandmother      Arthritis Maternal Grandmother      Asthma Paternal Grandmother      Thyroid disease Paternal Grandmother      Depression Paternal Grandmother      Fibromyalgia Paternal Grandmother      Diverticulitis Paternal Grandmother      Asthma Maternal Uncle x1         Childhood    Hypertension Paternal Uncle x1     Obesity Paternal Uncle x1     Sleep apnea Paternal Uncle x1     Breast cancer Other Mat Dz     Arthritis Other Mat Dz     Heart disease Other Mat Dz     Rheum arthritis Other Mat Dz     Cancer Other Mat Dz     Asthma Other Pat Dz     Eczema Other Pat Dz     Allergic rhinitis Other Pat Dz     Hypertension Other Pat Dz     Cancer Other Pat Dz        Social History     Socioeconomic History    Marital status:    Tobacco Use    Smoking status: Never    Smokeless tobacco: Never   Substance and Sexual Activity    Alcohol use: No    Drug use: No    Sexual activity: Never   Social History Narrative    SOC.HX:  Intact family; parents . Lives with Mom (Rosa),  Dad, and 2 siblings (Chrissy Serra) in [place], [place]. Mom & Dad have family in the area. Lots of support from Mom's/Dad's families. Mom at home. Dad works doing Computer Models w/ an Electrical [employer]. Other Caregivers: Grandparents. NO Smokers. + Pets -- 1 dog, indoor, 2 dogs, outdoor.        EDU:  Will be in 11th Grade at [employer]. Good grades: YES, B's-A's, some C's the last year; Good conduct: YES; Lots of friends: YES.        SAFETY:  Wears seatbelt 100% of time. Guns -- Yes, locked up, hunting.    -- Activities: Softball, Soccer, Phone/Texting, Computer, TV, Swimming, some Archery, Video games.    -- Drugs: Illicit drugs: NO; Alcohol: NO; Tobacco: NO.    -- Sex: Denies sexual activity.     Social Drivers of Health     Financial Resource Strain: Low Risk  (1/12/2025)    Overall Financial Resource Strain (CARDIA)     Difficulty of Paying Living Expenses: Not very hard   Food Insecurity: No Food Insecurity (1/12/2025)    Hunger Vital Sign     Worried About Running Out of Food in the Last Year: Never true     Ran Out of Food in the Last Year: Never true   Physical Activity: Insufficiently Active (1/12/2025)    Exercise Vital Sign     Days of Exercise per Week: 3 days     Minutes of Exercise per Session: 30 min   Stress: No Stress Concern Present (1/12/2025)    [employer] - Occupational Stress Questionnaire     Feeling of Stress : Only a little   Housing Stability: Unknown (1/12/2025)    Housing Stability Vital Sign     Unable to Pay for Housing in the Last Year: No       Review of patient's allergies indicates:   Allergen Reactions    No known drug allergies         Health Maintenance Due   Topic Date Due    Hepatitis C Screening  Never done    HIV Screening  Never done    COVID-19 Vaccine (2 - 2024-25 season) 09/01/2024       Objective     Vitals:    01/14/25 1100   BP: 132/80   Pulse: 63      Body mass index is 39.39 kg/m².     Physical Exam  Constitutional:  "      Appearance: Normal appearance.   HENT:      Head: Normocephalic and atraumatic.      Nose: Nose normal.      Mouth/Throat:      Mouth: Mucous membranes are moist.   Eyes:      Extraocular Movements: Extraocular movements intact.   Cardiovascular:      Rate and Rhythm: Normal rate.   Musculoskeletal:      Cervical back: Neck supple.   Neurological:      Mental Status: She is alert and oriented to person, place, and time.   Psychiatric:         Mood and Affect: Mood normal.         Behavior: Behavior normal.       Assessment & Plan    Maintained current fluoxetine dosage due to good response  Reviewed use of hydroxyzine at night for sleep, deemed appropriate  Noted positive JONH test and family history, suggesting possible rheumatological condition  Discussed prior imaging results, radiologist clarified "BB" marking on scan with no concerning findings    SEVERE EPISODE OF RECURRENT MAJOR DEPRESSIVE DISORDER, WITHOUT PSYCHOTIC FEATURES:  - Evaluated the patient's response to fluoxetine 20mg, noting significant improvement in mood and sleep.  - Explained that full effectiveness of fluoxetine typically takes 4-6 weeks, though improvement can be seen earlier.  - Continued fluoxetine 20mg daily in the morning.  - Prescribed a 90-day supply of fluoxetine.  - Continued hydroxyzine at night as needed for sleep.  - Instructed the patient to notify when hydroxyzine refill is needed.  - Scheduled follow up in 3 months for medication review.  - Advised the patient to contact the office sooner if needed.  - Acknowledged patient's attempt to schedule an appointment with psychology, noting the long wait list.  - Measured vital signs: blood pressure 132/80, heart rate 63, which are within normal range.    POSITIVE JONH:  - Noted positive JONH test result.  - Referred patient to Dr. So, rheumatologist in [place], for evaluation of positive JONH and family history of rheumatological conditions.  - Discontinued previous " rheumatology referral.    FAMILY HISTORY OF RHEUMATOLOGICAL CONDITIONS:  - Noted family history of rheumatological conditions, with mother and grandmother seeing the same rheumatologist.         Generalized anxiety disorder with panic attacks  -     FLUoxetine 20 MG capsule; Take 1 capsule (20 mg total) by mouth once daily.  Dispense: 90 capsule; Refill: 1  -chronic, improved with medication, continue , follow up in 3 months   -[facility]    Severe episode of recurrent major depressive disorder, without psychotic features  -     FLUoxetine 20 MG capsule; Take 1 capsule (20 mg total) by mouth once daily.  Dispense: 90 capsule; Refill: 1  -chronic, improving, reports feeling much better since starting fluox 20, to continue, f/u 3 mo  -[facility] waitlist     JONH positive  -     Ambulatory referral/consult to Rheumatology; Future; Expected date: 01/21/2025  -external referral placed    Pain in joints of both feet  -     Ambulatory referral/consult to Rheumatology; Future; Expected date: 01/21/2025        Follow up in about 3 months (around 4/14/2025), or if symptoms worsen or fail to improve.    This note was generated with the assistance of ambient listening technology. Verbal consent was obtained by the patient and accompanying visitor(s) for the recording of patient appointment to facilitate this note. I attest to having reviewed and edited the generated note for accuracy, though some syntax or spelling errors may persist. Please contact the author of this note for any clarification.        Angelica Castro MD  01/14/2025

## 2025-02-07 DIAGNOSIS — F33.2 SEVERE EPISODE OF RECURRENT MAJOR DEPRESSIVE DISORDER, WITHOUT PSYCHOTIC FEATURES: ICD-10-CM

## 2025-02-07 DIAGNOSIS — F41.1 GENERALIZED ANXIETY DISORDER WITH PANIC ATTACKS: ICD-10-CM

## 2025-02-07 DIAGNOSIS — F41.0 GENERALIZED ANXIETY DISORDER WITH PANIC ATTACKS: ICD-10-CM

## 2025-02-07 NOTE — TELEPHONE ENCOUNTER
Refill Routing Note   Medication(s) are not appropriate for processing by [facility] for the following reason(s):        Non-participating provider    [facility] action(s):  Route               Appointments  past 12m or future 3m with PCP    Date Provider   Last Visit   1/14/2025 Angelica Castro MD   Next Visit   Visit date not found Angelica Castro MD   ED visits in past 90 days: 0        Note composed:5:53 PM 02/07/2025

## 2025-02-10 RX ORDER — HYDROXYZINE HYDROCHLORIDE 10 MG/1
10 TABLET, FILM COATED ORAL 3 TIMES DAILY PRN
Qty: 30 TABLET | Refills: 3 | Status: SHIPPED | OUTPATIENT
Start: 2025-02-10

## 2025-02-18 ENCOUNTER — PATIENT MESSAGE (OUTPATIENT)
Dept: PSYCHIATRY | Facility: CLINIC | Age: 30
End: 2025-02-18
Payer: COMMERCIAL

## 2025-08-12 DIAGNOSIS — F41.0 GENERALIZED ANXIETY DISORDER WITH PANIC ATTACKS: ICD-10-CM

## 2025-08-12 DIAGNOSIS — F41.1 GENERALIZED ANXIETY DISORDER WITH PANIC ATTACKS: ICD-10-CM

## 2025-08-12 DIAGNOSIS — F33.2 SEVERE EPISODE OF RECURRENT MAJOR DEPRESSIVE DISORDER, WITHOUT PSYCHOTIC FEATURES: ICD-10-CM

## 2025-08-12 RX ORDER — FLUOXETINE 20 MG/1
20 CAPSULE ORAL DAILY
Qty: 90 CAPSULE | Refills: 0 | Status: SHIPPED | OUTPATIENT
Start: 2025-08-12 | End: 2026-08-12